# Patient Record
Sex: FEMALE
[De-identification: names, ages, dates, MRNs, and addresses within clinical notes are randomized per-mention and may not be internally consistent; named-entity substitution may affect disease eponyms.]

---

## 2019-12-27 ENCOUNTER — HOSPITAL ENCOUNTER (OUTPATIENT)
Dept: HOSPITAL 92 - BICRAD | Age: 39
Discharge: HOME | End: 2019-12-27
Attending: INTERNAL MEDICINE
Payer: COMMERCIAL

## 2019-12-27 DIAGNOSIS — M77.41: Primary | ICD-10-CM

## 2019-12-27 DIAGNOSIS — M77.42: ICD-10-CM

## 2019-12-27 NOTE — RAD
LEFT FOOT THREE VIEWS:

 

HISTORY:

Pain. Metatarsalgia.

 

FINDINGS/IMPRESSION:

No fracture, dislocation or other significant acute osseous process.

 

POS: [facility]

## 2019-12-27 NOTE — RAD
RIGHT FOOT THREE VIEWS:

 

HISTORY:

Metatarsalgia right foot, pain.

 

FINDINGS/IMPRESSION:

No fracture, dislocation or other significant acute osseous process.

 

POS: [facility]